# Patient Record
Sex: FEMALE | Race: WHITE | Employment: UNEMPLOYED | URBAN - METROPOLITAN AREA
[De-identification: names, ages, dates, MRNs, and addresses within clinical notes are randomized per-mention and may not be internally consistent; named-entity substitution may affect disease eponyms.]

---

## 2017-10-29 PROBLEM — Z34.90 PREGNANCY: Status: ACTIVE | Noted: 2017-10-29

## 2020-06-26 ENCOUNTER — HOSPITAL ENCOUNTER (EMERGENCY)
Age: 30
Discharge: HOME | End: 2020-06-26
Attending: EMERGENCY MEDICINE
Payer: MEDICAID

## 2020-06-26 VITALS
HEART RATE: 65 BPM | WEIGHT: 240 LBS | DIASTOLIC BLOOD PRESSURE: 87 MMHG | TEMPERATURE: 98 F | BODY MASS INDEX: 42.51 KG/M2 | SYSTOLIC BLOOD PRESSURE: 141 MMHG | OXYGEN SATURATION: 98 % | RESPIRATION RATE: 16 BRPM

## 2020-06-26 DIAGNOSIS — N61.1 ABSCESS OF LEFT BREAST: Primary | ICD-10-CM

## 2020-06-26 PROCEDURE — 99283 EMERGENCY DEPT VISIT LOW MDM: CPT

## 2020-06-26 PROCEDURE — 74011250637 HC RX REV CODE- 250/637: Performed by: EMERGENCY MEDICINE

## 2020-06-26 RX ORDER — IBUPROFEN 600 MG/1
600 TABLET ORAL
Status: COMPLETED | OUTPATIENT
Start: 2020-06-26 | End: 2020-06-26

## 2020-06-26 RX ORDER — IBUPROFEN 600 MG/1
600 TABLET ORAL
Qty: 20 TAB | Refills: 0 | Status: SHIPPED | OUTPATIENT
Start: 2020-06-26 | End: 2020-10-25

## 2020-06-26 RX ORDER — CLINDAMYCIN HYDROCHLORIDE 300 MG/1
300 CAPSULE ORAL 4 TIMES DAILY
Qty: 40 CAP | Refills: 0 | Status: SHIPPED | OUTPATIENT
Start: 2020-06-26 | End: 2020-07-06

## 2020-06-26 RX ORDER — CLINDAMYCIN HYDROCHLORIDE 150 MG/1
300 CAPSULE ORAL
Status: COMPLETED | OUTPATIENT
Start: 2020-06-26 | End: 2020-06-26

## 2020-06-26 RX ADMIN — IBUPROFEN 600 MG: 600 TABLET, FILM COATED ORAL at 22:05

## 2020-06-26 RX ADMIN — CLINDAMYCIN HYDROCHLORIDE 300 MG: 150 CAPSULE ORAL at 22:05

## 2020-06-27 NOTE — DISCHARGE INSTRUCTIONS
Patient Education        Mastitis: Care Instructions  Your Care Instructions  Mastitis is an inflammation of the breast. It occurs most often in women who are breastfeeding, but it can affect any woman. Mastitis can be caused by poor milk flow from the breast. When milk builds up in a breast, it leaks into the nearby breast tissue. Infection can also develop when the nipples become cracked or irritated. The tissue can then become infected with bacteria. Antibiotics can usually cure mastitis. For women who are nursing, continued breastfeeding (or pumping) can help. If mastitis is not treated, a pocket of pus may form in the breast and need to be drained. Follow-up care is a key part of your treatment and safety. Be sure to make and go to all appointments, and call your doctor if you are having problems. It's also a good idea to know your test results and keep a list of the medicines you take. How can you care for yourself at home? · If your doctor prescribed antibiotics, take them as directed. Do not stop taking them just because you feel better. You need to take the full course of antibiotics. · If you are breastfeeding, continue breastfeeding or pumping breast milk. It is important to empty your breasts regularly, every 2 to 3 hours while you are awake. These tips may help:  ? Before breastfeeding, place a warm, wet washcloth over your breast for about 15 minutes. Try this at least 3 times a day. This increases milk flow in the breast. Massaging the affected breast may also increase milk flow. ? Breastfeed on both sides. Try to start with your healthy breast first. Then, after your milk is flowing, breastfeed from the affected breast until it feels soft. You should empty this breast completely. Then switch back to the healthy breast and breastfeed until your baby has finished. ? Pump or hand-express a small amount of breast milk before breastfeeding if your breasts are too full with milk.  This will make your breasts less full and may make it easier for your baby to latch on to your breast.  ? Pump or express milk from the affected breast if it hurts too much to breastfeed. · Take an over-the-counter pain medicine, such as acetaminophen (Tylenol) or ibuprofen (Advil, Motrin) to relieve pain and fever. Read and follow all instructions on the label. · Do not take two or more pain medicines at the same time unless the doctor told you to. Many pain medicines have acetaminophen, which is Tylenol. Too much acetaminophen (Tylenol) can be harmful. · Rest as much as possible. · Drink extra fluids. · If pus is draining from your infected breast, wash the nipple gently and let it air-dry before you put your bra back on. A disposable breast pad placed in the bra cup may absorb the pus. · Sometimes, a blocked nipple pore, called a milk blister (or bleb) happens. This causes milk to back up in the breast. It can cause mastitis, so it's important to treat this if it happens. A milk blister is often a white dot on your nipple that can be painful. If a milk blister is causing you pain, it may help to place a warm, wet washcloth over the blister before breastfeeding or pumping. If this doesn't clear the blockage, your doctor can open the blister using a sterile needle. When should you call for help? Call your doctor now or seek immediate medical care if:  · You have worse symptoms of a breast infection, such as:  ? Increased pain, swelling, redness, or warmth around a breast.  ? Red streaks leading from a breast.  ? Pus draining from a breast.  ? A fever. Watch closely for changes in your health, and be sure to contact your doctor if:  · You do not get better as expected. Where can you learn more? Go to http://yuliet-albina.info/  Enter Y207 in the search box to learn more about \"Mastitis: Care Instructions. \"  Current as of: February 11, 2020               Content Version: 12.5  © 5360-6123 [employer]. Care instructions adapted under license by [employer] (which disclaims liability or warranty for this information). If you have questions about a medical condition or this instruction, always ask your healthcare professional. [address] any warranty or liability for your use of this information.

## 2020-06-27 NOTE — ED NOTES
Medicated as charted. Discussed with pt the importance of having mammogram done. Pt verbalized being fearful of having first mammogram. Spent time answering pt's questions and providing reassurance. Pt states she has mammogram scheduled for July 21 but will call to request an earlier appt and inform them of her ER visit. Pt confirmed that she will follow through with having mammogram completed as recommended by MD.    I have reviewed discharge instructions with the patient. The patient verbalized understanding. Medication teaching given, to include name, dose, action, and side effects. Patient verbalized understanding of medications. Encouraged patient to voice any concerns with reassurance provided. Instructed not to drive or use heavy machinery while taking this medication. Patient states they have someone to drive them home. Patient armband removed and shredded    Patient Discharged in stable condition.

## 2020-06-27 NOTE — ED PROVIDER NOTES
HPI patient is a 20-year-old female who presents to the ER for evaluation of an intermittent abscess of her left breast x4 months. She complained having pain in her left breast with inflammation and area of ruptured abscess. No other complaints. History reviewed. No pertinent past medical history. Past Surgical History:   Procedure Laterality Date    ENDOMETRIAL CRYOABLATION  10/2017         Family History:   Problem Relation Age of Onset    Heart Disease Mother        Social History     Socioeconomic History    Marital status: SINGLE     Spouse name: Not on file    Number of children: Not on file    Years of education: Not on file    Highest education level: Not on file   Occupational History    Not on file   Social Needs    Financial resource strain: Not on file    Food insecurity     Worry: Not on file     Inability: Not on file    Transportation needs     Medical: Not on file     Non-medical: Not on file   Tobacco Use    Smoking status: Current Every Day Smoker     Packs/day: 0.50    Smokeless tobacco: Never Used   Substance and Sexual Activity    Alcohol use: No    Drug use: No    Sexual activity: Never   Lifestyle    Physical activity     Days per week: Not on file     Minutes per session: Not on file    Stress: Not on file   Relationships    Social connections     Talks on phone: Not on file     Gets together: Not on file     Attends Oriental orthodox service: Not on file     Active member of club or organization: Not on file     Attends meetings of clubs or organizations: Not on file     Relationship status: Not on file    Intimate partner violence     Fear of current or ex partner: Not on file     Emotionally abused: Not on file     Physically abused: Not on file     Forced sexual activity: Not on file   Other Topics Concern    Not on file   Social History Narrative    Not on file         ALLERGIES: Vicodin [hydrocodone-acetaminophen]    Review of Systems   Constitutional: Negative. HENT: Negative. Eyes: Negative. Respiratory: Negative. Cardiovascular: Negative. Gastrointestinal: Negative. Endocrine: Negative. Genitourinary: Negative. Musculoskeletal: Negative. Skin: Negative. (+) left breast abscess     Allergic/Immunologic: Negative. Neurological: Negative. Hematological: Negative. Psychiatric/Behavioral: Negative. All other systems reviewed and are negative. Vitals:    06/26/20 2112   BP: 141/87   Pulse: 65   Resp: 16   Temp: 98 °F (36.7 °C)   SpO2: 98%   Weight: 108.9 kg (240 lb)            Physical Exam  Vitals signs and nursing note reviewed. Constitutional:       General: She is not in acute distress. Appearance: She is well-developed. She is not diaphoretic. HENT:      Head: Normocephalic. Right Ear: External ear normal.      Left Ear: External ear normal.      Mouth/Throat:      Pharynx: No oropharyngeal exudate. Eyes:      General: No scleral icterus. Right eye: No discharge. Left eye: No discharge. Conjunctiva/sclera: Conjunctivae normal.      Pupils: Pupils are equal, round, and reactive to light. Neck:      Musculoskeletal: Normal range of motion and neck supple. Thyroid: No thyromegaly. Vascular: No JVD. Trachea: No tracheal deviation. Cardiovascular:      Rate and Rhythm: Normal rate and regular rhythm. Heart sounds: Normal heart sounds. No murmur. No friction rub. No gallop. Pulmonary:      Effort: Pulmonary effort is normal. No respiratory distress. Breath sounds: Normal breath sounds. No stridor. No wheezing or rales. Chest:      Chest wall: No tenderness. Abdominal:      General: Bowel sounds are normal. There is no distension. Palpations: Abdomen is soft. There is no mass. Tenderness: There is no abdominal tenderness. There is no guarding or rebound. Musculoskeletal: Normal range of motion. General: No tenderness.    Lymphadenopathy: Cervical: No cervical adenopathy. Skin:     General: Skin is warm and dry. Coloration: Skin is not pale. Findings: No erythema or rash. Comments: LEFT  BREAST: extra large: (+) two 1 cm rupture abscesses in RLQ tender to palpation   Neurological:      Mental Status: She is alert and oriented to person, place, and time. Cranial Nerves: No cranial nerve deficit. Motor: No abnormal muscle tone. Coordination: Coordination normal.      Deep Tendon Reflexes: Reflexes normal.          MDM  Number of Diagnoses or Management Options  Abscess of left breast:   Risk of Complications, Morbidity, and/or Mortality  Presenting problems: low  Diagnostic procedures: low  Management options: low           Procedures    Differential diagnosis: Abscess cellulitis, breast tumor, cyst    Diagnosis: Abscess of left breast    Disposition: Physical findings were discussed with patient. She was told that she has abscess in left breast.  She needs to follow-up with a general surgeon or primary physician to get a mammogram to rule out breast cancer. Patient states she understood our discussion and she will make arrangements to see someone to rule out the possibility of her having breast cancer. Disp: D/C  Home. Rx: clindamycin, motrin  Return to ER prn. Dictation disclaimer:  Please note that this dictation was completed with [employer], the computer voice recognition software. Quite often unanticipated grammatical, syntax, homophones, and other interpretive errors are inadvertently transcribed by the computer software. Please disregard these errors. Please excuse any errors that have escaped final proofreading.